# Patient Record
Sex: FEMALE | Race: WHITE | NOT HISPANIC OR LATINO | Employment: UNEMPLOYED | URBAN - METROPOLITAN AREA
[De-identification: names, ages, dates, MRNs, and addresses within clinical notes are randomized per-mention and may not be internally consistent; named-entity substitution may affect disease eponyms.]

---

## 2020-06-03 ENCOUNTER — OFFICE VISIT (OUTPATIENT)
Dept: ENDOCRINOLOGY | Facility: CLINIC | Age: 50
End: 2020-06-03

## 2020-06-03 VITALS
SYSTOLIC BLOOD PRESSURE: 120 MMHG | OXYGEN SATURATION: 99 % | DIASTOLIC BLOOD PRESSURE: 80 MMHG | WEIGHT: 177 LBS | HEART RATE: 72 BPM

## 2020-06-03 DIAGNOSIS — E03.9 ACQUIRED HYPOTHYROIDISM: Primary | ICD-10-CM

## 2020-06-03 PROCEDURE — 99243 OFF/OP CNSLTJ NEW/EST LOW 30: CPT | Performed by: PHYSICIAN ASSISTANT

## 2020-06-03 RX ORDER — LIOTHYRONINE SODIUM 5 UG/1
5 TABLET ORAL DAILY
Qty: 30 TABLET | Refills: 3 | Status: SHIPPED | OUTPATIENT
Start: 2020-06-03 | End: 2020-09-09 | Stop reason: SDUPTHER

## 2020-06-03 RX ORDER — LEVOTHYROXINE SODIUM 88 UG/1
88 TABLET ORAL DAILY
COMMUNITY
End: 2020-06-03 | Stop reason: DRUGHIGH

## 2020-06-03 RX ORDER — LEVOTHYROXINE SODIUM 0.05 MG/1
50 TABLET ORAL DAILY
Qty: 30 TABLET | Refills: 3 | Status: SHIPPED | OUTPATIENT
Start: 2020-06-03 | End: 2020-09-09 | Stop reason: SDUPTHER

## 2020-06-03 NOTE — PROGRESS NOTES
"  Chief Complaint:   Leesa Melendez is a 49 y.o. female who is being seen today for  Hypothyroidism . Referred by Maria Luisa Schafer APRN     HPI     48 yo fairly healthy female comes in for further management of hypothyroidism.   She was diagnosed in her 30s and has been on levothyroxine the whole time. Before diagnosed he felt extremely fatigued and asked her provider to check for hypothyroidism and that's how she was diagnosed.  She does not think she has felt better on the levothyroxine even when her TSH was normal. She admits she does not take it regularly because it doesn't help with how she feels. Her last TSH was >10 4/2020 but she was not taking levothyroxine consistently. She c/o chronic fatigue, difficulty losing weight, forgetfulness and \"brain fog.\" She does snore at night. Denies constipation, dry skin, edema.   She is very frustrated with how tired and forgetful she is and wants to try different. She is insisting on trying adding T3.     Diagnosed: in her 30s  Medication: levothyroxine 100mcg daily  Previous medication: no  Hx of radiation to head/neck: no  Family hx of thyroid cancer: no    The following portions of the patient's history were reviewed and updated as appropriate: allergies, current medications, past family history, past medical history, past social history, past surgical history and problem list.    Review of Systems  Review of Systems   Constitutional: Positive for fatigue.        Inability to lose weight   Neurological:        Brain fog, forgetfulness   All other systems reviewed and are negative.       Current medications:  Current Outpatient Medications   Medication Sig Dispense Refill   • levothyroxine (Synthroid) 50 MCG tablet Take 1 tablet by mouth Daily. 30 tablet 3   • liothyronine (CYTOMEL) 5 MCG tablet Take 1 tablet by mouth Daily. 30 tablet 3     No current facility-administered medications for this visit.        Physical Exam   Vitals:    06/03/20 1434   BP: 120/80   Pulse: 72 "   SpO2: 99%   There is no height or weight on file to calculate BMI.  Physical Exam   Constitutional: She is oriented to person, place, and time. She appears well-developed. No distress.   HENT:   Head: Normocephalic.   Right Ear: External ear normal.   Left Ear: External ear normal.   Mouth/Throat: No oropharyngeal exudate.   Eyes: Conjunctivae and lids are normal. Right eye exhibits no discharge. Left eye exhibits no discharge. Right pupil is reactive. Left pupil is reactive.   Neck: No JVD present. No tracheal deviation present. No thyroid mass and no thyromegaly present.   Cardiovascular: Normal rate, regular rhythm, normal heart sounds and intact distal pulses.   No murmur heard.  Pulmonary/Chest: Effort normal and breath sounds normal. No respiratory distress. She has no wheezes.   Abdominal: Soft. Bowel sounds are normal. There is no tenderness.   Musculoskeletal: She exhibits no edema or tenderness.   Lymphadenopathy:     She has no cervical adenopathy.   Neurological: She is alert and oriented to person, place, and time.   Skin: Skin is warm, dry and intact. No rash noted. She is not diaphoretic. No erythema.   Psychiatric: She has a normal mood and affect. Her speech is normal and behavior is normal. Thought content normal.       Labs and Imaging   No results found for: TSH, S5ARMVP, L9BSOPF, THYROIDAB   Labs reviewed:  4/29/2020- TSH 10.6, FT4 0.78, B12 wnl    Assessment / Plan     Leesa was seen today for establish care.    Diagnoses and all orders for this visit:    Acquired hypothyroidism  -     levothyroxine (Synthroid) 50 MCG tablet; Take 1 tablet by mouth Daily.  -     liothyronine (CYTOMEL) 5 MCG tablet; Take 1 tablet by mouth Daily.        Problem List Items Addressed This Visit        Endocrine    Acquired hypothyroidism - Primary    Relevant Medications    levothyroxine (Synthroid) 50 MCG tablet    liothyronine (CYTOMEL) 5 MCG tablet        Diagnosis was discussed and reviewed with the patient  including the advantages of drug therapy.        1. Patient appears clinically hypothyroid. Last TSH >10 but pt was not taking medication consistently. She does not believe it helps any of her symptoms. My recommendation is to take levothyroxine or synthroid brand 88mcg daily x 6 weeks consistently so we can get a baseline TSH. She is however very frustrated with how she feels and insisting on trying T3. I suspect the dose she needs is likely around 88mcg if taken consistently therefore will decrease levothyroxine to 88mcg daily and add cytomel 5mcg. She understands increased risk of palpitations and arrhythmias with cytomel and agrees to take it. Discussed must take medication consistently without missing doses x 6 weeks for labs to be accurate. Encouraged getting a pill box so she knows if she missed a dose. If she does miss a day ok to double up the following day. I am not repeat TFTs today and she has not been consistent on her medication. Will check these next visit. Discussed once TSH is normalized we could trial desiccated thyroid hormone.       We have discussed in details the nature of the thyroid disease, thyroid hormone action, optimal TSH goals of 0.5-3.5, method of administration of levothyroxine and medication interactions.  I recommended taking the medication on an empty stomach in the morning or at bedtime, at least 30 minutes prior to intake of food or hot drinks and 4 hours apart from calcium or iron supplements.  2. Patient will return to our office in 6 weeks.   3. The risks and benefits of my recommendations, as well as other treatment options were discussed with the patient today. Questions were answered.     25 min of 45 face-to-face visit time spent for coordination of care and counselling regarding identified problems as outlined in the objective, assessment and discussion portions of the documentation.    Oswald Paredes PA-C

## 2020-07-16 ENCOUNTER — LAB (OUTPATIENT)
Dept: LAB | Facility: HOSPITAL | Age: 50
End: 2020-07-16

## 2020-07-16 ENCOUNTER — OFFICE VISIT (OUTPATIENT)
Dept: ENDOCRINOLOGY | Facility: CLINIC | Age: 50
End: 2020-07-16

## 2020-07-16 VITALS
WEIGHT: 172 LBS | SYSTOLIC BLOOD PRESSURE: 112 MMHG | OXYGEN SATURATION: 97 % | DIASTOLIC BLOOD PRESSURE: 67 MMHG | HEART RATE: 62 BPM | BODY MASS INDEX: 29.37 KG/M2 | HEIGHT: 64 IN

## 2020-07-16 DIAGNOSIS — E03.9 ACQUIRED HYPOTHYROIDISM: Primary | ICD-10-CM

## 2020-07-16 PROCEDURE — 84439 ASSAY OF FREE THYROXINE: CPT | Performed by: PHYSICIAN ASSISTANT

## 2020-07-16 PROCEDURE — 86376 MICROSOMAL ANTIBODY EACH: CPT | Performed by: PHYSICIAN ASSISTANT

## 2020-07-16 PROCEDURE — 86800 THYROGLOBULIN ANTIBODY: CPT | Performed by: PHYSICIAN ASSISTANT

## 2020-07-16 PROCEDURE — 84480 ASSAY TRIIODOTHYRONINE (T3): CPT | Performed by: PHYSICIAN ASSISTANT

## 2020-07-16 PROCEDURE — 99213 OFFICE O/P EST LOW 20 MIN: CPT | Performed by: PHYSICIAN ASSISTANT

## 2020-07-16 PROCEDURE — 84443 ASSAY THYROID STIM HORMONE: CPT | Performed by: PHYSICIAN ASSISTANT

## 2020-07-16 NOTE — PROGRESS NOTES
"  Chief Complaint:   Leesa Melendez is a 49 y.o. female who is being seen today for  Hypothyroidism .     HPI     48 yo fairly healthy female comes in for f/u of hypothyroidism.     She feels better overall with addition of cytomel. Has more energy. By lunch time energy starts to decrease again. \"brain fog\" and forgetfulness has improved. She would like to take another cytomel in the afternoon.   Has lost about 5 pounds since last visit.   She denies palpitations and tremors.   Wants thyroid abs checked.     Diagnosed: in her 30s  Medication: levothyroxine 50mcg plus cytomel 5mcg daily, she has been taking the medication consistently for the past 6 weeks  Previous medication: no  Hx of radiation to head/neck: no  Family hx of thyroid cancer: no    The following portions of the patient's history were reviewed and updated by me as appropriate: allergies, current medications, past family history, past social history, past surgical history and problem list.      Review of Systems  Review of Systems   Constitutional: Positive for fatigue (improving).   All other systems reviewed and are negative.       Current medications:  Current Outpatient Medications   Medication Sig Dispense Refill   • levothyroxine (Synthroid) 50 MCG tablet Take 1 tablet by mouth Daily. 30 tablet 3   • liothyronine (CYTOMEL) 5 MCG tablet Take 1 tablet by mouth Daily. 30 tablet 3     No current facility-administered medications for this visit.        Physical Exam   Vitals:    07/16/20 1522   BP: 112/67   Pulse: 62   SpO2: 97%   Body mass index is 29.52 kg/m².  Physical Exam   Constitutional: She is oriented to person, place, and time. She appears well-developed. No distress.   HENT:   Head: Normocephalic.   Right Ear: External ear normal.   Left Ear: External ear normal.   Mouth/Throat: No oropharyngeal exudate.   Eyes: Conjunctivae and lids are normal. Right eye exhibits no discharge. Left eye exhibits no discharge. Right pupil is reactive. Left pupil " is reactive.   Neck: No JVD present. No tracheal deviation present. No thyroid mass and no thyromegaly present.   Cardiovascular: Normal rate, regular rhythm, normal heart sounds and intact distal pulses.   No murmur heard.  Pulmonary/Chest: Effort normal and breath sounds normal. No respiratory distress. She has no wheezes.   Abdominal: Soft. Bowel sounds are normal. There is no tenderness.   Musculoskeletal: She exhibits no edema or tenderness.   Lymphadenopathy:     She has no cervical adenopathy.   Neurological: She is alert and oriented to person, place, and time.   Skin: Skin is warm, dry and intact. No rash noted. She is not diaphoretic. No erythema.   Psychiatric: She has a normal mood and affect. Her speech is normal and behavior is normal. Thought content normal.       Labs and Imaging   No results found for: TSH, S4QMRJU, F1NIKRH, THYROIDAB   Labs reviewed:  4/29/2020- TSH 10.6, FT4 0.78, B12 wnl    Assessment / Plan     Leesa was seen today for hypothyroidism.    Diagnoses and all orders for this visit:    Acquired hypothyroidism  -     TSH  -     T4, Free  -     T3  -     Thyroid Antibodies        Problem List Items Addressed This Visit        Endocrine    Acquired hypothyroidism - Primary    Relevant Orders    TSH    T4, Free    T3    Thyroid Antibodies        Diagnosis was discussed and reviewed with the patient including the advantages of drug therapy.        1. Patient appears clinically euthyroid. Repeat TFTs. Discussed with pt if TSH is high we would be increasing levothyroxine, not cytomel. Discussed need to keep in mind proper ratio of T4:T3.       We have discussed in details the nature of the thyroid disease, thyroid hormone action, optimal TSH goals of 0.5-3.5, method of administration of levothyroxine and medication interactions.  I recommended taking the medication on an empty stomach in the morning or at bedtime, at least 30 minutes prior to intake of food or hot drinks and 4 hours apart  from calcium or iron supplements.  2. Patient will return to our office in 3 months   3. The risks and benefits of my recommendations, as well as other treatment options were discussed with the patient today. Questions were answered.       Oswald Paredes PA-C

## 2020-07-17 LAB
T3 SERPL-MCNC: 125 NG/DL (ref 80–200)
T4 FREE SERPL-MCNC: 0.93 NG/DL (ref 0.93–1.7)
TSH SERPL DL<=0.05 MIU/L-ACNC: 2.19 UIU/ML (ref 0.27–4.2)

## 2020-07-20 ENCOUNTER — TELEPHONE (OUTPATIENT)
Dept: ENDOCRINOLOGY | Facility: CLINIC | Age: 50
End: 2020-07-20

## 2020-07-20 LAB
THYROGLOB AB SERPL-ACNC: 1 IU/ML (ref 0–0.9)
THYROPEROXIDASE AB SERPL-ACNC: 217 IU/ML (ref 0–34)

## 2020-07-20 NOTE — TELEPHONE ENCOUNTER
PATIENT IS REQUESTING A RETURN CALL. SHE IS WANTING HER RECENT LAB RESULTS.     CALL BACK [phone]

## 2020-07-21 NOTE — TELEPHONE ENCOUNTER
PATIENT CALLED FOR RECENT LAB RESULTS. INFORMED PATIENT THAT RESULT LETTER IS IN THE MAIL AND SHE SHOULD RECEIVE IT WITHIN THE NEXT FEW DAYS AND TO CALL THE OFFICE IF SHE HAS ANY QUESTIONS.

## 2020-07-22 ENCOUNTER — TELEPHONE (OUTPATIENT)
Dept: ENDOCRINOLOGY | Facility: CLINIC | Age: 50
End: 2020-07-22

## 2020-07-22 NOTE — TELEPHONE ENCOUNTER
Called and spoke to patient, according to lab letters. Her medication stays the same. Informed her of this. She verbalized understanding and had no further questions.

## 2020-07-22 NOTE — TELEPHONE ENCOUNTER
PT RECEIVED HER LETTER AND SHE WANTED TO KNOW IF HER CYTOMEL WAS GOING TO BE INCREASED    PT USES [facility] IN [place] IF WE NEED TO CALL IT IN    PTS NUMBER [phone]

## 2020-09-09 ENCOUNTER — TELEPHONE (OUTPATIENT)
Dept: ENDOCRINOLOGY | Facility: CLINIC | Age: 50
End: 2020-09-09

## 2020-09-09 DIAGNOSIS — E03.9 ACQUIRED HYPOTHYROIDISM: ICD-10-CM

## 2020-09-09 RX ORDER — LIOTHYRONINE SODIUM 5 UG/1
5 TABLET ORAL DAILY
Qty: 30 TABLET | Refills: 3 | Status: SHIPPED | OUTPATIENT
Start: 2020-09-09 | End: 2021-09-09

## 2020-09-09 RX ORDER — LEVOTHYROXINE SODIUM 0.05 MG/1
50 TABLET ORAL DAILY
Qty: 30 TABLET | Refills: 3 | Status: SHIPPED | OUTPATIENT
Start: 2020-09-09 | End: 2021-09-09

## 2020-09-09 NOTE — TELEPHONE ENCOUNTER
PATIENT CALLED TODAY CONCERNED ABOUT THE COST OF AN UPCOMING APPT. SHE STATES THAT SHE IS NOW ON HER LAST UNEMPLOYMENT CHECK AND DOESN'T KNOW HOW SHE CAN AFFORD TO COME IN SO OFTEN PLUS THE COST OF GAS TO GET HERE ALONG WITH OUT OF POCKET LAB CHARGES. SHE WOULD LIKE TO KNOW IF IT'S OK WITH [facility] TO PUSH THE APPOINTMENT OUT SOME.    SHE ALSO STATES THAT SHE IS ON HER LAST REFILL OF MEDICATION AND WOULD LIKE TO BE ABLE TO GET REFILLS.     CALL BACK [phone]

## 2023-08-16 ENCOUNTER — TRANSCRIBE ORDERS (OUTPATIENT)
Dept: ADMINISTRATIVE | Facility: HOSPITAL | Age: 53
End: 2023-08-16
Payer: COMMERCIAL

## 2023-08-16 DIAGNOSIS — Z12.31 BREAST CANCER SCREENING BY MAMMOGRAM: Primary | ICD-10-CM
